# Patient Record
Sex: MALE | Race: WHITE
[De-identification: names, ages, dates, MRNs, and addresses within clinical notes are randomized per-mention and may not be internally consistent; named-entity substitution may affect disease eponyms.]

---

## 2019-06-02 ENCOUNTER — HOSPITAL ENCOUNTER (EMERGENCY)
Dept: HOSPITAL 91 - E/R | Age: 39
Discharge: HOME | End: 2019-06-02
Payer: COMMERCIAL

## 2019-06-02 ENCOUNTER — HOSPITAL ENCOUNTER (EMERGENCY)
Dept: HOSPITAL 10 - E/R | Age: 39
Discharge: HOME | End: 2019-06-02
Payer: COMMERCIAL

## 2019-06-02 VITALS
BODY MASS INDEX: 33.59 KG/M2 | HEIGHT: 66 IN | BODY MASS INDEX: 33.59 KG/M2 | WEIGHT: 209 LBS | WEIGHT: 209 LBS | HEIGHT: 66 IN

## 2019-06-02 VITALS — SYSTOLIC BLOOD PRESSURE: 126 MMHG | DIASTOLIC BLOOD PRESSURE: 75 MMHG | RESPIRATION RATE: 18 BRPM | HEART RATE: 85 BPM

## 2019-06-02 DIAGNOSIS — I16.0: Primary | ICD-10-CM

## 2019-06-02 DIAGNOSIS — I10: ICD-10-CM

## 2019-06-02 LAB
ADD MAN DIFF?: NO
ALANINE AMINOTRANSFERASE: 73 IU/L (ref 13–69)
ALBUMIN/GLOBULIN RATIO: 1.22
ALBUMIN: 4.3 G/DL (ref 3.3–4.9)
ALKALINE PHOSPHATASE: 110 IU/L (ref 42–121)
ANION GAP: 10 (ref 5–13)
ASPARTATE AMINO TRANSFERASE: 58 IU/L (ref 15–46)
B-TYPE NATRIURETIC PEPTIDE: < 11 PG/ML (ref 0–125)
BASOPHIL #: 0.1 10^3/UL (ref 0–0.1)
BASOPHILS %: 1 % (ref 0–2)
BILIRUBIN,DIRECT: 0 MG/DL (ref 0–0.2)
BILIRUBIN,TOTAL: 0.4 MG/DL (ref 0.2–1.3)
BLOOD UREA NITROGEN: 17 MG/DL (ref 7–20)
CALCIUM: 9.5 MG/DL (ref 8.4–10.2)
CARBON DIOXIDE: 24 MMOL/L (ref 21–31)
CHLORIDE: 107 MMOL/L (ref 97–110)
CK INDEX: 0.5
CK-MB: 1.15 NG/ML (ref 0–2.4)
CREATINE KINASE: 251 IU/L (ref 23–200)
CREATININE: 0.97 MG/DL (ref 0.61–1.24)
EOSINOPHILS #: 0.2 10^3/UL (ref 0–0.5)
EOSINOPHILS %: 2.1 % (ref 0–7)
GLOBULIN: 3.5 G/DL (ref 1.3–3.2)
GLUCOSE: 102 MG/DL (ref 70–220)
HEMATOCRIT: 44.6 % (ref 42–52)
HEMOGLOBIN: 14.9 G/DL (ref 14–18)
IMMATURE GRANS #M: 0.05 10^3/UL (ref 0–0.03)
IMMATURE GRANS % (M): 0.4 % (ref 0–0.43)
INR: 0.87
LYMPHOCYTES #: 2.4 10^3/UL (ref 0.8–2.9)
LYMPHOCYTES %: 20.8 % (ref 15–51)
MEAN CORPUSCULAR HEMOGLOBIN: 29.2 PG (ref 29–33)
MEAN CORPUSCULAR HGB CONC: 33.4 G/DL (ref 32–37)
MEAN CORPUSCULAR VOLUME: 87.5 FL (ref 82–101)
MEAN PLATELET VOLUME: 12.2 FL (ref 7.4–10.4)
MONOCYTE #: 0.9 10^3/UL (ref 0.3–0.9)
MONOCYTES %: 7.7 % (ref 0–11)
NEUTROPHIL #: 7.9 10^3/UL (ref 1.6–7.5)
NEUTROPHILS %: 68 % (ref 39–77)
NUCLEATED RED BLOOD CELLS #: 0 10^3/UL (ref 0–0)
NUCLEATED RED BLOOD CELLS%: 0 /100WBC (ref 0–0)
PARTIAL THROMBOPLASTIN TIME: 25.4 SEC (ref 23–35)
PLATELET COUNT: 191 10^3/UL (ref 140–415)
POTASSIUM: 4.2 MMOL/L (ref 3.5–5.1)
PROTIME: 11.9 SEC (ref 11.9–14.9)
PT RATIO: 0.9
RED BLOOD COUNT: 5.1 10^6/UL (ref 4.7–6.1)
RED CELL DISTRIBUTION WIDTH: 13.5 % (ref 11.5–14.5)
SODIUM: 141 MMOL/L (ref 135–144)
TOTAL PROTEIN: 7.8 G/DL (ref 6.1–8.1)
TROPONIN-I: < 0.012 NG/ML (ref 0–0.12)
WHITE BLOOD COUNT: 11.6 10^3/UL (ref 4.8–10.8)

## 2019-06-02 PROCEDURE — 85025 COMPLETE CBC W/AUTO DIFF WBC: CPT

## 2019-06-02 PROCEDURE — 80053 COMPREHEN METABOLIC PANEL: CPT

## 2019-06-02 PROCEDURE — 83880 ASSAY OF NATRIURETIC PEPTIDE: CPT

## 2019-06-02 PROCEDURE — 93005 ELECTROCARDIOGRAM TRACING: CPT

## 2019-06-02 PROCEDURE — 70450 CT HEAD/BRAIN W/O DYE: CPT

## 2019-06-02 PROCEDURE — 85730 THROMBOPLASTIN TIME PARTIAL: CPT

## 2019-06-02 PROCEDURE — 82553 CREATINE MB FRACTION: CPT

## 2019-06-02 PROCEDURE — 99285 EMERGENCY DEPT VISIT HI MDM: CPT

## 2019-06-02 PROCEDURE — 84484 ASSAY OF TROPONIN QUANT: CPT

## 2019-06-02 PROCEDURE — 85610 PROTHROMBIN TIME: CPT

## 2019-06-02 PROCEDURE — 71045 X-RAY EXAM CHEST 1 VIEW: CPT

## 2019-06-02 PROCEDURE — 82550 ASSAY OF CK (CPK): CPT

## 2019-06-15 NOTE — ERD
ER Documentation


Chief Complaint


Chief Complaint





OUT OF HTN MED X 2 DAYS , FEELS DIZZY , BP @ HOME 180/112





HPI


This is a 38-year-old male presents to the emergency department with a mild ba


ndlike headache.  The patient indicates this headache has been present for 2 


days since he ran out of his blood pressure medication.  The patient denies any 


chest pain.  He has no shortness of breath at rest or exertion.  He denies any 


abdominal pain.  He states he took his blood pressure at home and it was 


180/112.  He states this is not the worst headache of his life.  He denies any 


neck pain and is remained afebrile.





ROS


All systems reviewed and are negative except as per history of present illness.





Medications


Home Meds


Active Scripts


Atenolol* (Atenolol*) 25 Mg Tablet, 15 MG PO DAILY, #30 TAB


   Prov:OTTO WARD MD         6/2/19


Clonidine Hcl* (Clonidine Hcl*) 0.1 Mg Tab, 0.1 MG PO BID PRN for ELEVATED BLOOD


PRESSURE, #10 TAB


   Prov:OTTO WARD MD         6/2/19


Reported Medications


Atenolol* (Atenolol*) 50 Mg Tablet, 50 MG PO Q OTHER DAY, #30 TAB


   6/2/19





Allergies


Allergies:  


Coded Allergies:  


     No Known Allergy (Unverified , 6/2/19)





PMhx/Soc


History of Surgery:  Yes (abd sx)


Hx Miscellaneous Medical Probl:  Yes (htn)


Hx Alcohol Use:  No


Hx Substance Use:  No


Hx Tobacco Use:  No


Smoking Status:  Never smoker





Physical Exam


Physical Exam


Constitutional:Well-developed. Well-nourished.


HEENT:Normocephalic. Atraumatic.Pupils were equal round reactive to light. Moist


mucous membranes.Fundoscopy exam showed sharp optic disks and venous pulsations 


are present


Neck: No nuchal rigidity. No lymphadenopathy. No posterior cervical spine 


tenderness or step-offs.


Respiratory: Not using accessory muscles of respiration.Lungs were clear to 


auscultation bilaterally. No rhonchi. No rales. No wheezing. 


Cardiovascular: Regular rate regular rhythm.No murmurs. No rubs were 


appreciated.S1, S2 normal. Distal pulses are palpable 2+ bilaterally.


GI: Abdomen was soft. Nontender. Non Distended. No pulsatile abdominal masses or


bruits. No rebound. No guarding. Bowel sounds were present and normal. 


NEURO: Patient was alert, awake, orientated x3.No facial droop. Gait observed 


and normal with no ataxia.Speech had regular rate and rhythm. No focal 


neurological deficits.


Results 24 hrs





Laboratory Tests


              Test
                                  6/2/19
17:36


              White Blood Count                     11.6 10^3/ul


              Red Blood Count                       5.10 10^6/ul


              Hemoglobin                               14.9 g/dl


              Hematocrit                                  44.6 %


              Mean Corpuscular Volume                    87.5 fl


              Mean Corpuscular Hemoglobin                29.2 pg


              Mean Corpuscular Hemoglobin
Concent     33.4 g/dl 



              Red Cell Distribution Width                 13.5 %


              Platelet Count                         191 10^3/UL


              Mean Platelet Volume                       12.2 fl


              Immature Granulocytes %                    0.400 %


              Neutrophils %                               68.0 %


              Lymphocytes %                               20.8 %


              Monocytes %                                  7.7 %


              Eosinophils %                                2.1 %


              Basophils %                                  1.0 %


              Nucleated Red Blood Cells %            0.0 /100WBC


              Immature Granulocytes #              0.050 10^3/ul


              Neutrophils #                          7.9 10^3/ul


              Lymphocytes #                          2.4 10^3/ul


              Monocytes #                            0.9 10^3/ul


              Eosinophils #                          0.2 10^3/ul


              Basophils #                            0.1 10^3/ul


              Nucleated Red Blood Cells #            0.0 10^3/ul


              Prothrombin Time                          11.9 Sec


              Prothrombin Time Ratio                         0.9


              INR International Normalized
Ratio           0.87 



              Activated Partial
Thromboplast Time      25.4 Sec 



              Sodium Level                            141 mmol/L


              Potassium Level                         4.2 mmol/L


              Chloride Level                          107 mmol/L


              Carbon Dioxide Level                     24 mmol/L


              Anion Gap                                       10


              Blood Urea Nitrogen                       17 mg/dl


              Creatinine                              0.97 mg/dl


              Est Glomerular Filtrat Rate
mL/min   > 60 mL/min 



              Glucose Level                            102 mg/dl


              Calcium Level                            9.5 mg/dl


              Total Bilirubin                          0.4 mg/dl


              Direct Bilirubin                        0.00 mg/dl


              Indirect Bilirubin                       0.4 mg/dl


              Aspartate Amino Transf
(AST/SGOT)         58 IU/L 



              Alanine Aminotransferase
(ALT/SGPT)       73 IU/L 



              Alkaline Phosphatase                      110 IU/L


              Creatine Kinase                           251 IU/L


              Creatine Kinase Index                          0.5


              Creatinine Kinase MB (Mass)             1.15 ng/ml


              Troponin I                           < 0.012 ng/ml


              B-Type Natriuretic Peptide           < 11 PG/ML


              Total Protein                             7.8 g/dl


              Albumin                                   4.3 g/dl


              Globulin                                 3.50 g/dl


              Albumin/Globulin Ratio                        1.22





Current Medications


 Medications
   Dose
          Sig/Nata
       Start Time
   Status  Last


 (Trade)       Ordered        Route
 PRN     Stop Time              Admin
Dose


                              Reason                                Admin


 Clonidine
     0.1 mg         ONCE  ONCE
    6/2/19        Cancel   



(Catapres)                    PO
            17:30
 6/2/19


                                             17:31








Procedures/MDM


This patient presented to the emergency department with severely elevated blood 


pressure. My differential diagnosis included but was not limited to conditions 


that could end-organ damage such as acute coronary syndrome, acute pulmonary 


edema, aortic dissection, subarachnoid hemorrhage, intracerebral hemorrhage, 


cerebral infarction, withdrawal syndromes from beta blockers, or states of 


catecholamine excess such as pheochromocytoma or drug intoxication. 





Ancillary lab work was obtained. There was no elevation in the BUN and creat


inine to suggest acute renal failure. Electrolytes were normal. Cardiac enzyme 


was normal and the 12 lead EKG showed no acute ischemic changes or left 


ventricular hypertrophy. 


12 Lead EKG tracing ordered and reviewed by myself showed: 


Normal sinus rhythm of 68 bpm and no arrhythmia.


KS interval normal.


QRS duration normal.


No ST segment elevation


No ST segment depression. No changes consistent with acute ischemia. 





I obtained a chest radiograph that showed no infiltrates no pneumothorax and no 


pleural effusions.  CT scan of the head showed no intracerebral hemorrhage mass-


effect or midline shift.  I did feel the patient cephalgia was likely secondary 


to his elevated blood pressure.  This resolved in the emergency department after


given clonidine.





Given that the patient had an absence of cerebral, ocular, cardiac or renal 


damage the hypertensive urgency was treated with oral agents in the emergency 


room with improvement of the patient's blood pressure. The patient likely 


appeared to be complaint with primary care physician and will follow up with 


their PCP in the next 24-48 hours. They were instructed to return to the 


emergency department at anytime if there is any worsening of their condition 


such as development of chest pain or a headache. They were instructed to resume 


previous medication regimen or initiate a suitable medication regimen under care


of the PCP to enable proper monitoring for drug reactions. The patient was also 


informed on the adverse side effects and adverse drug interactions of the medic


ations prescribed to them by myself. The patient gave informed consent to the 


prescription of the new medication.





Departure


Diagnosis:  


   Primary Impression:  


   Hypertensive urgency


Condition:  Fair


Patient Instructions:  Hypertension, Established











OTTO WARD MD            Rhett 15, 2019 14:27

## 2019-08-26 ENCOUNTER — HOSPITAL ENCOUNTER (EMERGENCY)
Dept: HOSPITAL 91 - E/R | Age: 39
Discharge: HOME | End: 2019-08-26
Payer: COMMERCIAL

## 2019-08-26 ENCOUNTER — HOSPITAL ENCOUNTER (EMERGENCY)
Dept: HOSPITAL 10 - E/R | Age: 39
Discharge: HOME | End: 2019-08-26
Payer: COMMERCIAL

## 2019-08-26 VITALS
BODY MASS INDEX: 33.69 KG/M2 | WEIGHT: 209.66 LBS | HEIGHT: 66 IN | HEIGHT: 66 IN | WEIGHT: 209.66 LBS | BODY MASS INDEX: 33.69 KG/M2

## 2019-08-26 VITALS — RESPIRATION RATE: 16 BRPM | SYSTOLIC BLOOD PRESSURE: 134 MMHG | DIASTOLIC BLOOD PRESSURE: 83 MMHG | HEART RATE: 81 BPM

## 2019-08-26 DIAGNOSIS — Z76.0: ICD-10-CM

## 2019-08-26 DIAGNOSIS — Z96.642: ICD-10-CM

## 2019-08-26 DIAGNOSIS — R40.2362: ICD-10-CM

## 2019-08-26 DIAGNOSIS — F41.9: ICD-10-CM

## 2019-08-26 DIAGNOSIS — I10: Primary | ICD-10-CM

## 2019-08-26 DIAGNOSIS — R40.2252: ICD-10-CM

## 2019-08-26 DIAGNOSIS — R40.2142: ICD-10-CM

## 2019-08-26 PROCEDURE — 99281 EMR DPT VST MAYX REQ PHY/QHP: CPT
